# Patient Record
Sex: FEMALE | Race: OTHER | NOT HISPANIC OR LATINO
[De-identification: names, ages, dates, MRNs, and addresses within clinical notes are randomized per-mention and may not be internally consistent; named-entity substitution may affect disease eponyms.]

---

## 2020-10-15 PROBLEM — Z00.00 ENCOUNTER FOR PREVENTIVE HEALTH EXAMINATION: Status: ACTIVE | Noted: 2020-10-15

## 2020-10-22 ENCOUNTER — NON-APPOINTMENT (OUTPATIENT)
Age: 85
End: 2020-10-22

## 2020-10-22 DIAGNOSIS — S43.429A SPRAIN OF UNSPECIFIED ROTATOR CUFF CAPSULE, INITIAL ENCOUNTER: ICD-10-CM

## 2020-10-22 DIAGNOSIS — M19.019 PRIMARY OSTEOARTHRITIS, UNSPECIFIED SHOULDER: ICD-10-CM

## 2020-10-22 DIAGNOSIS — Z80.9 FAMILY HISTORY OF MALIGNANT NEOPLASM, UNSPECIFIED: ICD-10-CM

## 2020-10-22 DIAGNOSIS — S43.439A SUPERIOR GLENOID LABRUM LESION OF UNSPECIFIED SHOULDER, INITIAL ENCOUNTER: ICD-10-CM

## 2020-10-22 DIAGNOSIS — Z82.61 FAMILY HISTORY OF ARTHRITIS: ICD-10-CM

## 2020-10-22 DIAGNOSIS — I48.91 UNSPECIFIED ATRIAL FIBRILLATION: ICD-10-CM

## 2020-10-22 DIAGNOSIS — Z78.9 OTHER SPECIFIED HEALTH STATUS: ICD-10-CM

## 2020-10-22 DIAGNOSIS — C18.9 MALIGNANT NEOPLASM OF COLON, UNSPECIFIED: ICD-10-CM

## 2020-10-22 DIAGNOSIS — M17.0 BILATERAL PRIMARY OSTEOARTHRITIS OF KNEE: ICD-10-CM

## 2020-10-22 DIAGNOSIS — R23.8 OTHER SKIN CHANGES: ICD-10-CM

## 2020-10-22 DIAGNOSIS — M75.40 IMPINGEMENT SYNDROME OF UNSPECIFIED SHOULDER: ICD-10-CM

## 2020-10-22 DIAGNOSIS — I25.10 ATHEROSCLEROTIC HEART DISEASE OF NATIVE CORONARY ARTERY W/OUT ANGINA PECTORIS: ICD-10-CM

## 2020-10-22 DIAGNOSIS — C44.90 UNSPECIFIED MALIGNANT NEOPLASM OF SKIN, UNSPECIFIED: ICD-10-CM

## 2020-10-22 DIAGNOSIS — Z86.018 PERSONAL HISTORY OF OTHER BENIGN NEOPLASM: ICD-10-CM

## 2020-10-22 DIAGNOSIS — I49.9 CARDIAC ARRHYTHMIA, UNSPECIFIED: ICD-10-CM

## 2020-10-27 PROBLEM — Z82.61 FAMILY HISTORY OF ARTHRITIS: Status: ACTIVE | Noted: 2020-10-27

## 2020-10-27 PROBLEM — C18.9 COLON CANCER: Status: ACTIVE | Noted: 2020-10-27

## 2020-10-27 PROBLEM — Z80.9 FHX: CANCER: Status: ACTIVE | Noted: 2020-10-27

## 2020-10-27 PROBLEM — M19.019 OSTEOARTHRITIS OF SHOULDER: Status: ACTIVE | Noted: 2020-10-27

## 2020-10-27 PROBLEM — S43.439A LABRAL TEAR OF SHOULDER: Status: RESOLVED | Noted: 2020-10-27 | Resolved: 2020-10-27

## 2020-10-27 PROBLEM — R23.8 EASY BRUISING: Status: ACTIVE | Noted: 2020-10-27

## 2020-10-27 PROBLEM — C44.90 SKIN CANCER: Status: ACTIVE | Noted: 2020-10-27

## 2020-10-27 PROBLEM — S43.429A ROTATOR CUFF SPRAIN: Status: RESOLVED | Noted: 2020-10-27 | Resolved: 2020-10-27

## 2020-10-27 PROBLEM — Z86.018 HISTORY OF OSTEOCHONDROMA: Status: RESOLVED | Noted: 2020-10-27 | Resolved: 2020-10-27

## 2020-10-27 PROBLEM — M17.0 OSTEOARTHRITIS OF KNEES, BILATERAL: Status: ACTIVE | Noted: 2020-10-27

## 2020-10-27 PROBLEM — I25.10 CAD (CORONARY ARTERY DISEASE): Status: ACTIVE | Noted: 2020-10-27

## 2020-10-27 PROBLEM — M75.40 IMPINGEMENT SYNDROME OF SHOULDER REGION, UNSPECIFIED LATERALITY: Status: RESOLVED | Noted: 2020-10-27 | Resolved: 2020-10-27

## 2020-10-27 PROBLEM — I49.9 CARDIAC DYSRHYTHMIA: Status: ACTIVE | Noted: 2020-10-27

## 2020-10-27 PROBLEM — I48.91 ATRIAL FIBRILLATION, UNSPECIFIED TYPE: Status: ACTIVE | Noted: 2020-10-27

## 2020-10-27 PROBLEM — Z78.9 NON-SMOKER: Status: ACTIVE | Noted: 2020-10-27

## 2020-10-27 PROBLEM — Z78.9 DOES NOT USE ILLICIT DRUGS: Status: ACTIVE | Noted: 2020-10-27

## 2020-10-27 RX ORDER — FOLIC ACID 1 MG/1
1 TABLET ORAL DAILY
Qty: 90 | Refills: 2 | Status: ACTIVE | COMMUNITY

## 2020-10-27 RX ORDER — WARFARIN 2 MG/1
2 TABLET ORAL DAILY
Refills: 0 | Status: ACTIVE | COMMUNITY

## 2020-10-27 RX ORDER — FUROSEMIDE 20 MG/1
20 TABLET ORAL DAILY
Refills: 0 | Status: ACTIVE | COMMUNITY

## 2020-10-27 RX ORDER — AMOXICILLIN 500 MG/1
500 TABLET, FILM COATED ORAL
Refills: 0 | Status: ACTIVE | COMMUNITY

## 2020-10-27 RX ORDER — VALSARTAN 80 MG/1
80 TABLET ORAL TWICE DAILY
Refills: 0 | Status: ACTIVE | COMMUNITY

## 2020-10-27 RX ORDER — FAMOTIDINE 20 MG/1
20 TABLET, FILM COATED ORAL TWICE DAILY
Refills: 0 | Status: ACTIVE | COMMUNITY

## 2020-10-27 RX ORDER — MULTIVIT-MIN/FOLIC/VIT K/LYCOP 400-300MCG
50 MCG TABLET ORAL
Refills: 0 | Status: ACTIVE | COMMUNITY

## 2020-10-27 RX ORDER — CLOPIDOGREL 75 MG/1
75 TABLET, FILM COATED ORAL DAILY
Qty: 90 | Refills: 3 | Status: ACTIVE | COMMUNITY

## 2020-10-27 RX ORDER — IBUPROFEN 200 MG
600 CAPSULE ORAL
Refills: 0 | Status: ACTIVE | COMMUNITY

## 2020-10-27 RX ORDER — LEVOTHYROXINE SODIUM 75 UG/1
75 TABLET ORAL DAILY
Refills: 0 | Status: ACTIVE | COMMUNITY

## 2020-10-27 RX ORDER — AMIODARONE HYDROCHLORIDE 200 MG/1
200 TABLET ORAL DAILY
Qty: 90 | Refills: 2 | Status: ACTIVE | COMMUNITY

## 2020-10-27 RX ORDER — POTASSIUM CHLORIDE 1.5 G/1.58G
20 POWDER, FOR SOLUTION ORAL DAILY
Refills: 0 | Status: ACTIVE | COMMUNITY

## 2021-01-06 ENCOUNTER — APPOINTMENT (OUTPATIENT)
Dept: INFECTIOUS DISEASE | Facility: CLINIC | Age: 86
End: 2021-01-06
Payer: MEDICARE

## 2021-01-06 VITALS
DIASTOLIC BLOOD PRESSURE: 82 MMHG | HEART RATE: 58 BPM | OXYGEN SATURATION: 95 % | TEMPERATURE: 98.2 F | SYSTOLIC BLOOD PRESSURE: 180 MMHG

## 2021-01-06 VITALS — WEIGHT: 155 LBS

## 2021-01-06 DIAGNOSIS — T84.59XA INFECTION AND INFLAMMATORY REACTION DUE TO OTHER INTERNAL JOINT PROSTHESIS, INITIAL ENCOUNTER: ICD-10-CM

## 2021-01-06 DIAGNOSIS — M00.9 PYOGENIC ARTHRITIS, UNSPECIFIED: ICD-10-CM

## 2021-01-06 DIAGNOSIS — Z96.659 INFECTION AND INFLAMMATORY REACTION DUE TO OTHER INTERNAL JOINT PROSTHESIS, INITIAL ENCOUNTER: ICD-10-CM

## 2021-01-06 PROCEDURE — 99213 OFFICE O/P EST LOW 20 MIN: CPT

## 2021-01-06 RX ORDER — ALENDRONATE SODIUM 70 MG/1
70 TABLET ORAL
Refills: 0 | Status: DISCONTINUED | COMMUNITY
End: 2021-01-06

## 2021-01-06 NOTE — ASSESSMENT
[FreeTextEntry1] : 89 year old female with  hx of CAD and atrial fibrillation on chronic suppressive Amoxil for a right TKA infection with S.mitis s/p I and D and liner exchange in 2018\par \par REC\par 1.c/w life long Amoxil 500 mg BID\par 2.Needs flu vaccine will get  with PMD or drug store.\par \par can f/u in 1 year

## 2021-01-06 NOTE — PHYSICAL EXAM
[General Appearance - Alert] : alert [General Appearance - In No Acute Distress] : in no acute distress [General Appearance - Well Nourished] : well nourished [Sclera] : the sclera and conjunctiva were normal [Hearing Threshold Finger Rub Not [place]] : hearing was normal [Neck Appearance] : the appearance of the neck was normal [] : no respiratory distress [Heart Rate And Rhythm] : heart rate was normal and rhythm regular [Abdomen Soft] : soft [FreeTextEntry1] : right knee without swelling or erythema  [Skin Color & Pigmentation] : normal skin color and pigmentation [Oriented To Time, Place, And Person] : oriented to person, place, and time

## 2021-01-06 NOTE — HISTORY OF PRESENT ILLNESS
[FreeTextEntry1] : 89 year old female, patient of mine since 2018. had an infected right TKA s/p I and D and liner exchange. cx + S.mitis. She is on life long Amoxil suppression 500mg BID since then.  \par She has a hx of atrial fibrillation, CAD and Colon CA \par \par has an allergy to Heparin\par Needs seasonal flu vaccine \par \par

## 2021-01-06 NOTE — REVIEW OF SYSTEMS
[Fever] : no fever [Chills] : no chills [Body Aches] : no body aches [Eyesight Problems] : no eyesight problems [Chest Pain] : no chest pain [Diarrhea] : no diarrhea [Skin Lesions] : no skin lesions [Anxiety] : no anxiety [Negative] : Heme/Lymph

## 2021-03-03 ENCOUNTER — NON-APPOINTMENT (OUTPATIENT)
Age: 86
End: 2021-03-03

## 2021-04-07 RX ORDER — AMOXICILLIN 500 MG/1
500 CAPSULE ORAL TWICE DAILY
Qty: 180 | Refills: 3 | Status: ACTIVE | COMMUNITY
Start: 2021-04-07 | End: 1900-01-01

## 2022-01-07 ENCOUNTER — TRANSCRIPTION ENCOUNTER (OUTPATIENT)
Age: 87
End: 2022-01-07

## 2022-01-08 ENCOUNTER — APPOINTMENT (OUTPATIENT)
Age: 87
End: 2022-01-08